# Patient Record
Sex: MALE | Race: WHITE | NOT HISPANIC OR LATINO | URBAN - METROPOLITAN AREA
[De-identification: names, ages, dates, MRNs, and addresses within clinical notes are randomized per-mention and may not be internally consistent; named-entity substitution may affect disease eponyms.]

---

## 2022-02-07 ENCOUNTER — TELEPHONE ENCOUNTER (OUTPATIENT)
Dept: URBAN - METROPOLITAN AREA CLINIC 92 | Facility: CLINIC | Age: 37
End: 2022-02-07

## 2022-02-07 ENCOUNTER — LAB OUTSIDE AN ENCOUNTER (OUTPATIENT)
Dept: URBAN - METROPOLITAN AREA CLINIC 92 | Facility: CLINIC | Age: 37
End: 2022-02-07

## 2022-02-07 ENCOUNTER — OFFICE VISIT (OUTPATIENT)
Dept: URBAN - METROPOLITAN AREA CLINIC 92 | Facility: CLINIC | Age: 37
End: 2022-02-07
Payer: COMMERCIAL

## 2022-02-07 ENCOUNTER — WEB ENCOUNTER (OUTPATIENT)
Dept: URBAN - METROPOLITAN AREA CLINIC 92 | Facility: CLINIC | Age: 37
End: 2022-02-07

## 2022-02-07 VITALS — WEIGHT: 178 LBS | HEART RATE: 76 BPM | HEIGHT: 69 IN | TEMPERATURE: 97 F | BODY MASS INDEX: 26.36 KG/M2

## 2022-02-07 DIAGNOSIS — R19.7 DIARRHEA, UNSPECIFIED TYPE: ICD-10-CM

## 2022-02-07 DIAGNOSIS — R63.4 WEIGHT LOSS: ICD-10-CM

## 2022-02-07 PROCEDURE — 99244 OFF/OP CNSLTJ NEW/EST MOD 40: CPT | Performed by: INTERNAL MEDICINE

## 2022-02-07 PROCEDURE — 99204 OFFICE O/P NEW MOD 45 MIN: CPT | Performed by: INTERNAL MEDICINE

## 2022-02-07 RX ORDER — SODIUM, POTASSIUM,MAG SULFATES 17.5-3.13G
3000ML SOLUTION, RECONSTITUTED, ORAL ORAL
Qty: 1 BOTTLE | Refills: 0 | OUTPATIENT
Start: 2022-02-07

## 2022-02-07 NOTE — HPI-TODAY'S VISIT:
36-year-old male with a history of HIV, recent CD4 reportedly above 500, HUMBLE, who presents to discuss fevers, diarrhea, vomiting.  He notes longstanding of IBS for several years since jena Giardia in 2007, but notes that his symptoms have been worse for the past 6 months.  On good days he has 2 bowel movements, but on bad days he has more than 10 bowel movements, including nocturnal diarrhea as well as fecal incontinence during the night.  He takes Imodium 2 tablets in the morning, and an additional Tablet before meals, and has done so for several years.  He feels much better when he takes antibiotics for his sinus issues.  Last colonoscopy normal in 2008, per patient.  Has also been tested for H. pylori, C. difficile, and stool culture within the last month that are negative.  His fevers are typically in the morning and range from 100-101, associated with fatigue, occur 1-2 times per week.  Has had blood work recently with PCP, unrevealing.  History of gonorrhea about 9 months ago, but recent rectal swab negative for GC and chlamydia.  He also vomits a few times per week, typically before breakfast, associated with heartburn-like symptoms.  Consumes edibles but has done so for several years.  Takes Nexium 1 time per day as well as Pepcid as needed.  Endorses a poor appetite and 20 pound weight loss over the past 1 year.  Patient referred by Dr. Loyd, and a copy of my note will be sent to her.

## 2022-03-14 ENCOUNTER — OFFICE VISIT (OUTPATIENT)
Dept: URBAN - METROPOLITAN AREA SURGERY CENTER 16 | Facility: SURGERY CENTER | Age: 37
End: 2022-03-14

## 2022-04-19 ENCOUNTER — OFFICE VISIT (OUTPATIENT)
Dept: URBAN - METROPOLITAN AREA SURGERY CENTER 16 | Facility: SURGERY CENTER | Age: 37
End: 2022-04-19
Payer: COMMERCIAL

## 2022-04-19 DIAGNOSIS — R19.7 ACUTE DIARRHEA: ICD-10-CM

## 2022-04-19 DIAGNOSIS — R11.2 ACUTE NAUSEA WITH NONBILIOUS VOMITING: ICD-10-CM

## 2022-04-19 DIAGNOSIS — A63.0 ANAL CONDYLOMA: ICD-10-CM

## 2022-04-19 DIAGNOSIS — R63.4 ABNORMAL INTENTIONAL WEIGHT LOSS: ICD-10-CM

## 2022-04-19 DIAGNOSIS — K31.89 ACQUIRED DEFORMITY OF DUODENUM: ICD-10-CM

## 2022-04-19 PROCEDURE — G8907 PT DOC NO EVENTS ON DISCHARG: HCPCS | Performed by: INTERNAL MEDICINE

## 2022-04-19 PROCEDURE — 45380 COLONOSCOPY AND BIOPSY: CPT | Performed by: INTERNAL MEDICINE

## 2022-04-19 PROCEDURE — 43239 EGD BIOPSY SINGLE/MULTIPLE: CPT | Performed by: INTERNAL MEDICINE

## 2022-04-19 RX ORDER — SODIUM, POTASSIUM,MAG SULFATES 17.5-3.13G
3000ML SOLUTION, RECONSTITUTED, ORAL ORAL
Qty: 1 BOTTLE | Refills: 0 | Status: ACTIVE | COMMUNITY
Start: 2022-02-07

## 2022-04-28 ENCOUNTER — TELEPHONE ENCOUNTER (OUTPATIENT)
Dept: URBAN - METROPOLITAN AREA CLINIC 92 | Facility: CLINIC | Age: 37
End: 2022-04-28

## 2022-04-29 ENCOUNTER — TELEPHONE ENCOUNTER (OUTPATIENT)
Dept: URBAN - METROPOLITAN AREA CLINIC 92 | Facility: CLINIC | Age: 37
End: 2022-04-29

## 2022-04-29 PROBLEM — 714974000: Status: ACTIVE | Noted: 2022-04-29

## 2023-07-12 ENCOUNTER — TELEPHONE ENCOUNTER (OUTPATIENT)
Dept: URBAN - METROPOLITAN AREA CLINIC 105 | Facility: CLINIC | Age: 38
End: 2023-07-12

## 2023-09-14 ENCOUNTER — OFFICE VISIT (OUTPATIENT)
Dept: URBAN - METROPOLITAN AREA CLINIC 92 | Facility: CLINIC | Age: 38
End: 2023-09-14

## 2023-10-04 ENCOUNTER — WEB ENCOUNTER (OUTPATIENT)
Dept: URBAN - METROPOLITAN AREA CLINIC 92 | Facility: CLINIC | Age: 38
End: 2023-10-04

## 2023-10-04 ENCOUNTER — DASHBOARD ENCOUNTERS (OUTPATIENT)
Age: 38
End: 2023-10-04

## 2023-10-04 ENCOUNTER — OFFICE VISIT (OUTPATIENT)
Dept: URBAN - METROPOLITAN AREA CLINIC 92 | Facility: CLINIC | Age: 38
End: 2023-10-04
Payer: COMMERCIAL

## 2023-10-04 VITALS
BODY MASS INDEX: 28.76 KG/M2 | WEIGHT: 194.2 LBS | DIASTOLIC BLOOD PRESSURE: 89 MMHG | TEMPERATURE: 96.1 F | HEART RATE: 97 BPM | HEIGHT: 69 IN | SYSTOLIC BLOOD PRESSURE: 140 MMHG

## 2023-10-04 DIAGNOSIS — K21.9 GERD: ICD-10-CM

## 2023-10-04 DIAGNOSIS — R19.7 DIARRHEA, UNSPECIFIED TYPE: ICD-10-CM

## 2023-10-04 DIAGNOSIS — A63.0 CONDYLOMA: ICD-10-CM

## 2023-10-04 PROCEDURE — 99214 OFFICE O/P EST MOD 30 MIN: CPT | Performed by: INTERNAL MEDICINE

## 2023-10-04 RX ORDER — OXYCODONE HYDROCHLORIDE 5 MG/1
TABLET ORAL
Qty: 90 TABLET | COMMUNITY

## 2023-10-04 RX ORDER — METHYLPREDNISOLONE 4 MG/1
TAKE 6 TABLETS ON DAY 1 AS DIRECTED ON PACKAGE AND DECREASE BY 1 TAB EACH DAY FOR A TOTAL OF 6 DAYS TABLET ORAL
Qty: 21 EACH | Refills: 0 | Status: DISCONTINUED | COMMUNITY

## 2023-10-04 RX ORDER — OMEPRAZOLE 40 MG/1
1 CAPSULE 30 MINUTES BEFORE MORNING AND EVENING MEAL CAPSULE, DELAYED RELEASE ORAL TWICE A DAY
Qty: 180 | Refills: 3 | OUTPATIENT
Start: 2023-10-04

## 2023-10-04 RX ORDER — LIFITEGRAST 50 MG/ML
SOLUTION/ DROPS OPHTHALMIC
Qty: 60 EACH | COMMUNITY

## 2023-10-04 RX ORDER — DESVENLAFAXINE SUCCINATE 100 MG/1
1 TABLET TABLET, EXTENDED RELEASE ORAL ONCE A DAY
Status: ACTIVE | COMMUNITY

## 2023-10-04 RX ORDER — TRAZODONE HYDROCHLORIDE 100 MG/1
2 TABS ORALLY ONCE DAILY AT BEDTIME AS NEEDED FOR INSOMNIA TABLET ORAL
Qty: 180 EACH | Refills: 0 | Status: ACTIVE | COMMUNITY

## 2023-10-04 RX ORDER — DEXTROAMPHETAMINE SACCHARATE, AMPHETAMINE ASPARTATE MONOHYDRATE, DEXTROAMPHETAMINE SULFATE, AMPHETAMINE SULFATE 7.5; 7.5; 7.5; 7.5 MG/1; MG/1; MG/1; MG/1
CAPSULE, EXTENDED RELEASE ORAL
Qty: 30 CAPSULE | Status: ACTIVE | COMMUNITY

## 2023-10-04 RX ORDER — SODIUM, POTASSIUM,MAG SULFATES 17.5-3.13G
3000ML SOLUTION, RECONSTITUTED, ORAL ORAL
Qty: 1 BOTTLE | Refills: 0 | Status: DISCONTINUED | COMMUNITY
Start: 2022-02-07

## 2023-10-04 RX ORDER — CLONAZEPAM 1 MG/1
TABLET, ORALLY DISINTEGRATING ORAL
Qty: 180 EACH | Status: ACTIVE | COMMUNITY

## 2023-10-04 RX ORDER — DOXYCYCLINE 100 MG/1
TABLET ORAL
Qty: 28 TABLET | Status: DISCONTINUED | COMMUNITY

## 2023-10-04 RX ORDER — LOSARTAN POTASSIUM 100 MG/1
1 TABLET TABLET ORAL ONCE A DAY
Status: ACTIVE | COMMUNITY

## 2023-10-04 NOTE — HPI-TODAY'S VISIT:
36-year-old male with a history of HIV, recent CD4 reportedly above 500, HUMBLE, who presents to discuss fevers, diarrhea, vomiting.  He notes longstanding of IBS for several years since jena Giardia in 2007, but notes that his symptoms have been worse for the past 6 months.  On good days he has 2 bowel movements, but on bad days he has more than 10 bowel movements, including nocturnal diarrhea as well as fecal incontinence during the night.  He takes Imodium 2 tablets in the morning, and an additional Tablet before meals, and has done so for several years.  He feels much better when he takes antibiotics for his sinus issues.  Last colonoscopy normal in 2008, per patient.  Has also been tested for H. pylori, C. difficile, and stool culture within the last month that are negative.  His fevers are typically in the morning and range from 100-101, associated with fatigue, occur 1-2 times per week.  Has had blood work recently with PCP, unrevealing.  History of gonorrhea about 9 months ago, but recent rectal swab negative for GC and chlamydia.  He also vomits a few times per week, typically before breakfast, associated with heartburn-like symptoms.  Consumes edibles but has done so for several years.  Takes Nexium 1 time per day as well as Pepcid as needed.  Endorses a poor appetite and 20 pound weight loss over the past 1 year.  Patient referred by Dr. Loyd, and a copy of my note will be sent to her.  ***10/2023: EGD April 2022 demonstrated 3 cm hiatal hernia.  Duodenal biopsies with focal minimal active inflammation, no evidence of celiac.  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 Random biopsies negative.  I referred him to Dr. Luis Patrick but has not seen him  I wanted to see him in follow-up, but have not seen him in over a year. Having diarrhea, nausea, decreased appetite. Does have a hx of SIBO. Takes 2 tablets of loperamide in the AM and additional ones as needed throughout the day, every day, and with that has 3-4 BMs/day.  Some nocturnal. Takes Nexium, but doesn't help with the nausea. Weight stable. GC/chlamydia test recently negative.

## 2023-10-18 ENCOUNTER — LAB OUTSIDE AN ENCOUNTER (OUTPATIENT)
Dept: URBAN - METROPOLITAN AREA CLINIC 92 | Facility: CLINIC | Age: 38
End: 2023-10-18

## 2023-10-23 ENCOUNTER — WEB ENCOUNTER (OUTPATIENT)
Dept: URBAN - METROPOLITAN AREA CLINIC 92 | Facility: CLINIC | Age: 38
End: 2023-10-23

## 2023-10-23 LAB
ADENOVIRUS F 40/41: NOT DETECTED
CAMPYLOBACTER: NOT DETECTED
CLOSTRIDIUM DIFFICILE: NOT DETECTED
ENTAMOEBA HISTOLYTICA: NOT DETECTED
ENTEROAGGREGATIVE E.COLI: NOT DETECTED
ENTEROTOXIGENIC E.COLI: NOT DETECTED
ESCHERICHIA COLI O157: NOT DETECTED
GIARDIA LAMBLIA: NOT DETECTED
NOROVIRUS GI/GII: NOT DETECTED
ROTAVIRUS A: NOT DETECTED
SALMONELLA SPP.: NOT DETECTED
SHIGA-LIKE TOXIN PRODUCING E.COLI: NOT DETECTED
SHIGELLA SPP. / ENTEROINVASIVE E.COLI: DETECTED
VIBRIO PARAHAEMOLYTICUS: NOT DETECTED
VIBRIO SPP.: NOT DETECTED
YERSINIA ENTEROCOLITICA: NOT DETECTED

## 2023-10-23 RX ORDER — AZITHROMYCIN MONOHYDRATE 500 MG/1
1 TABLET TABLET, FILM COATED ORAL
Qty: 3 | Refills: 0 | OUTPATIENT
Start: 2023-10-25 | End: 2023-10-28

## 2023-10-24 ENCOUNTER — WEB ENCOUNTER (OUTPATIENT)
Dept: URBAN - METROPOLITAN AREA CLINIC 92 | Facility: CLINIC | Age: 38
End: 2023-10-24

## 2025-07-08 ENCOUNTER — OFFICE VISIT (OUTPATIENT)
Dept: URBAN - METROPOLITAN AREA CLINIC 92 | Facility: CLINIC | Age: 40
End: 2025-07-08
Payer: COMMERCIAL

## 2025-07-08 DIAGNOSIS — K58.0 IRRITABLE BOWEL SYNDROME WITH DIARRHEA: ICD-10-CM

## 2025-07-08 PROBLEM — 197125005: Status: ACTIVE | Noted: 2025-07-08

## 2025-07-08 PROCEDURE — 99213 OFFICE O/P EST LOW 20 MIN: CPT

## 2025-07-08 RX ORDER — OXYCODONE HYDROCHLORIDE 5 MG/1
TABLET ORAL
Qty: 90 TABLET | Status: ACTIVE | COMMUNITY

## 2025-07-08 RX ORDER — TRAZODONE HYDROCHLORIDE 100 MG/1
2 TABS ORALLY ONCE DAILY AT BEDTIME AS NEEDED FOR INSOMNIA TABLET ORAL
Qty: 180 EACH | Refills: 0 | Status: ACTIVE | COMMUNITY

## 2025-07-08 RX ORDER — OMEPRAZOLE 40 MG/1
1 CAPSULE 30 MINUTES BEFORE MORNING AND EVENING MEAL CAPSULE, DELAYED RELEASE ORAL TWICE A DAY
Qty: 180 | Refills: 3 | Status: ACTIVE | COMMUNITY
Start: 2023-10-04

## 2025-07-08 RX ORDER — LOSARTAN POTASSIUM 100 MG/1
1 TABLET TABLET ORAL ONCE A DAY
Status: ACTIVE | COMMUNITY

## 2025-07-08 RX ORDER — RIFAXIMIN 550 MG/1
1 TABLET TABLET ORAL THREE TIMES A DAY
Qty: 42 TABLET | Refills: 0 | OUTPATIENT
Start: 2025-07-08 | End: 2025-07-22

## 2025-07-08 RX ORDER — DEXTROAMPHETAMINE SACCHARATE, AMPHETAMINE ASPARTATE MONOHYDRATE, DEXTROAMPHETAMINE SULFATE, AMPHETAMINE SULFATE 7.5; 7.5; 7.5; 7.5 MG/1; MG/1; MG/1; MG/1
CAPSULE, EXTENDED RELEASE ORAL
Qty: 30 CAPSULE | Status: ACTIVE | COMMUNITY

## 2025-07-08 RX ORDER — BUPROPION HYDROCHLORIDE 300 MG/1
1 TABLET IN THE MORNING TABLET ORAL ONCE A DAY
Status: ACTIVE | COMMUNITY

## 2025-07-08 NOTE — HPI-TODAY'S VISIT:
39-year-old male patient presents today for IBS diarrhea.  He originally started seeing Dr. GARZA and February 2022.  He has a history of HIV and SIBO.  He notes he had had longstanding IBS since jena Giardia in 2007.  At the time of office visit patient was having 10+ bowel movements daily.  He was also experiencing some weight loss.  Endoscopy 4- demonstrated 3 cm hiatal hernia biopsies of small bowel were negative Colonoscopy 4-2022 demonstrated diverticula entire colon, polyp in the rectum biopsy showed condyloma acuminata/low-grade squamous dysplasia, random colon biopsies negative. Patient was referred to see Dr. Luis Patrick Patient last saw Dr. GARZA in October 2023 and at that time stool studies were ordered and Shigella was positive so azithromycin was sent.  Today patient notes that he presented to [facility] in March after having diarrhea for a few days.  He noted he had taken Levaquin 2 weeks prior.  He also let them know he had C. difficile around 10 years ago.  It was assumed patient had C. difficile so he was prescribed vancomycin.  He felt better on vancomycin but once he stopped his symptoms came back.  He then presented to urgent care who again provided him with prescription for vancomycin.  He started to get better on medication but once he stopped symptoms again came back.  This time he followed up with his primary care who ordered stool studies on 6/25-25 which demonstrated no C. difficile, parasites or bacteria.  He is currently having 2-3 sometimes watery bowel movements daily.  He denies any hematochezia, melena, urgency, nocturnal stool.  He is not having any abdominal pain, nausea, vomiting, fever, chills. 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

## 2025-07-09 ENCOUNTER — TELEPHONE ENCOUNTER (OUTPATIENT)
Dept: URBAN - METROPOLITAN AREA CLINIC 92 | Facility: CLINIC | Age: 40
End: 2025-07-09

## 2025-07-10 ENCOUNTER — WEB ENCOUNTER (OUTPATIENT)
Dept: URBAN - METROPOLITAN AREA CLINIC 92 | Facility: CLINIC | Age: 40
End: 2025-07-10

## 2025-07-12 ENCOUNTER — WEB ENCOUNTER (OUTPATIENT)
Dept: URBAN - METROPOLITAN AREA CLINIC 92 | Facility: CLINIC | Age: 40
End: 2025-07-12

## 2025-07-17 ENCOUNTER — WEB ENCOUNTER (OUTPATIENT)
Dept: URBAN - METROPOLITAN AREA CLINIC 92 | Facility: CLINIC | Age: 40
End: 2025-07-17

## 2025-07-17 RX ORDER — ONDANSETRON 4 MG/1
1 TABLET ON THE TONGUE AND ALLOW TO DISSOLVE TABLET, ORALLY DISINTEGRATING ORAL ONCE A DAY
Qty: 30 | OUTPATIENT
Start: 2025-07-18

## 2025-07-18 ENCOUNTER — WEB ENCOUNTER (OUTPATIENT)
Dept: URBAN - METROPOLITAN AREA CLINIC 92 | Facility: CLINIC | Age: 40
End: 2025-07-18